# Patient Record
Sex: MALE | Race: WHITE | URBAN - METROPOLITAN AREA
[De-identification: names, ages, dates, MRNs, and addresses within clinical notes are randomized per-mention and may not be internally consistent; named-entity substitution may affect disease eponyms.]

---

## 2020-11-24 ENCOUNTER — OUTPATIENT (OUTPATIENT)
Dept: OUTPATIENT SERVICES | Facility: HOSPITAL | Age: 40
LOS: 1 days | End: 2020-11-24

## 2021-05-26 ENCOUNTER — APPOINTMENT (OUTPATIENT)
Dept: MRI IMAGING | Facility: CLINIC | Age: 41
End: 2021-05-26
Payer: COMMERCIAL

## 2021-05-26 PROCEDURE — 72148 MRI LUMBAR SPINE W/O DYE: CPT

## 2023-01-24 PROBLEM — Z00.00 ENCOUNTER FOR PREVENTIVE HEALTH EXAMINATION: Status: ACTIVE | Noted: 2023-01-24

## 2023-01-26 ENCOUNTER — APPOINTMENT (OUTPATIENT)
Dept: MRI IMAGING | Facility: CLINIC | Age: 43
End: 2023-01-26
Payer: COMMERCIAL

## 2023-01-26 PROCEDURE — A9585: CPT | Mod: NC

## 2023-01-26 PROCEDURE — 70553 MRI BRAIN STEM W/O & W/DYE: CPT

## 2023-03-21 LAB — TSH SERPL-ACNC: 4.42

## 2023-03-22 ENCOUNTER — NON-APPOINTMENT (OUTPATIENT)
Age: 43
End: 2023-03-22

## 2023-03-22 ENCOUNTER — APPOINTMENT (OUTPATIENT)
Dept: ENDOCRINOLOGY | Facility: CLINIC | Age: 43
End: 2023-03-22
Payer: COMMERCIAL

## 2023-03-22 VITALS
SYSTOLIC BLOOD PRESSURE: 114 MMHG | OXYGEN SATURATION: 97 % | WEIGHT: 248 LBS | BODY MASS INDEX: 34.72 KG/M2 | DIASTOLIC BLOOD PRESSURE: 70 MMHG | HEIGHT: 71 IN | HEART RATE: 86 BPM

## 2023-03-22 DIAGNOSIS — F41.9 ANXIETY DISORDER, UNSPECIFIED: ICD-10-CM

## 2023-03-22 DIAGNOSIS — F17.210 NICOTINE DEPENDENCE, CIGARETTES, UNCOMPLICATED: ICD-10-CM

## 2023-03-22 DIAGNOSIS — E22.9 HYPERFUNCTION OF PITUITARY GLAND, UNSPECIFIED: ICD-10-CM

## 2023-03-22 DIAGNOSIS — Z86.39 PERSONAL HISTORY OF OTHER ENDOCRINE, NUTRITIONAL AND METABOLIC DISEASE: ICD-10-CM

## 2023-03-22 DIAGNOSIS — E03.9 HYPOTHYROIDISM, UNSPECIFIED: ICD-10-CM

## 2023-03-22 PROCEDURE — 99204 OFFICE O/P NEW MOD 45 MIN: CPT

## 2023-03-22 RX ORDER — ALPRAZOLAM 0.25 MG/1
0.25 TABLET ORAL
Refills: 0 | Status: ACTIVE | COMMUNITY

## 2023-03-22 NOTE — ASSESSMENT
[FreeTextEntry1] : 42 year old male with PMH of anxiety, obesity and HLD referred for evaluation of mild hyperprolactinemia and findings of a pituitary microadenoma on recent imaging. \par \par 1.  Pituitary microadenoma-  this could represent a prolactinoma, however need to rule out macroprolactin as a cause of his mildly elevated prolactin level.  As such, will repeat full pituitary function panel now.  Will also order 24 hour urine free cortisol to rule out the rare chance of Cushing's Disease based on his complex of symptoms.  If hyperprolactinemia is confirmed, will start Carbegoline.  \par 2.  History of thyroid dysfunction-  check TFTs and thyroid antibody panel now.  \par \par Follow up to be determined based on results of labs.

## 2023-03-22 NOTE — REVIEW OF SYSTEMS
[Recent Weight Gain (___ Lbs)] : recent weight gain: [unfilled] lbs [Blurred Vision] : blurred vision [Palpitations] : palpitations [Constipation] : constipation [Diarrhea] : diarrhea [Erectile Dysfunction] : erectile dysfunction [Decreased Libido] : decreased libido [Anxiety] : anxiety [Shortness Of Breath] : no shortness of breath [Headaches] : no headaches [Cold Intolerance] : no cold intolerance

## 2023-03-22 NOTE — PHYSICAL EXAM
[Obese] : obese [No Acute Distress] : no acute distress [Normal Sclera/Conjunctiva] : normal sclera/conjunctiva [No Proptosis] : no proptosis [No Neck Mass] : no neck mass was observed [No LAD] : no lymphadenopathy [Supple] : the neck was supple [Thyroid Not Enlarged] : the thyroid was not enlarged [No Thyroid Nodules] : no palpable thyroid nodules [No Respiratory Distress] : no respiratory distress [Clear to Auscultation] : lungs were clear to auscultation bilaterally [Normal S1, S2] : normal S1 and S2 [No Murmurs] : no murmurs [Normal Rate] : heart rate was normal [Regular Rhythm] : with a regular rhythm [No Edema] : no peripheral edema [Normal Gait] : normal gait [No Clubbing, Cyanosis] : no clubbing  or cyanosis of the fingernails [Acanthosis Nigricans] : no acanthosis nigricans [No Tremors] : no tremors [Normal Affect] : the affect was normal [Normal Insight/Judgement] : insight and judgment were intact [Normal Mood] : the mood was normal

## 2023-03-22 NOTE — CONSULT LETTER
[Dear  ___] : Dear  [unfilled], [Consult Letter:] : I had the pleasure of evaluating your patient, [unfilled]. [Please see my note below.] : Please see my note below. [Consult Closing:] : Thank you very much for allowing me to participate in the care of this patient.  If you have any questions, please do not hesitate to contact me. [Sincerely,] : Sincerely, [FreeTextEntry3] : Greg Azar MD, FACE\par Chief of Endocrinology, [facility], [facility]\par , [employer] [facility]\par

## 2023-03-22 NOTE — DATA REVIEWED
[FreeTextEntry1] : LABS:\par 1/5/2023:\par LH 4.67\par PRL 39.7\par TSH 4.4\par T4  7\par Testosterone 500 \par \par MRI pituitary 1/26/2023:\par 5mm area of hypoenhancement within the right pituitary suggestive of a microadenoma.

## 2023-03-22 NOTE — HISTORY OF PRESENT ILLNESS
[FreeTextEntry1] : Referred here by Dr. Gutierres for the evaluation of hyperprolactinemia and a pituitary microadenoma. \par \par Over the past 2 years complains of fatigue and weight gain. \par He reports that several years ago, his testosterone levels were low, but he did not want to take replacement therapy.  In addition he had abnormal thyroid function in the past, which resolved without intervention.  \par In work up of his recent symptoms, labs were drawn in January showing a mildly elevated Prolactin level, but testosterone was normal at the time.  \par He was sent for MRI showing a possible 5 mm microadenoma within the pituitary gland.\par He does report less erections and low libido lately, but admits that he is under significant stress and struggles with anxiety.

## 2024-10-31 ENCOUNTER — APPOINTMENT (OUTPATIENT)
Dept: ENDOCRINOLOGY | Facility: CLINIC | Age: 44
End: 2024-10-31
Payer: COMMERCIAL

## 2024-10-31 ENCOUNTER — NON-APPOINTMENT (OUTPATIENT)
Age: 44
End: 2024-10-31

## 2024-10-31 VITALS
TEMPERATURE: 98 F | WEIGHT: 241 LBS | HEART RATE: 80 BPM | SYSTOLIC BLOOD PRESSURE: 120 MMHG | DIASTOLIC BLOOD PRESSURE: 80 MMHG | RESPIRATION RATE: 16 BRPM | BODY MASS INDEX: 33.74 KG/M2 | HEIGHT: 71 IN | OXYGEN SATURATION: 99 %

## 2024-10-31 DIAGNOSIS — E22.9 HYPERFUNCTION OF PITUITARY GLAND, UNSPECIFIED: ICD-10-CM

## 2024-10-31 PROCEDURE — 99213 OFFICE O/P EST LOW 20 MIN: CPT

## 2024-10-31 RX ORDER — DEXAMETHASONE 1 MG/1
1 TABLET ORAL
Qty: 1 | Refills: 0 | Status: ACTIVE | COMMUNITY
Start: 2024-10-31 | End: 1900-01-01